# Patient Record
Sex: FEMALE | Race: WHITE | Employment: FULL TIME | URBAN - METROPOLITAN AREA
[De-identification: names, ages, dates, MRNs, and addresses within clinical notes are randomized per-mention and may not be internally consistent; named-entity substitution may affect disease eponyms.]

---

## 2023-05-08 ENCOUNTER — OFFICE VISIT (OUTPATIENT)
Dept: ORTHOPEDIC SURGERY | Age: 28
End: 2023-05-08
Payer: COMMERCIAL

## 2023-05-08 DIAGNOSIS — M87.071 ASEPTIC NECROSIS OF RIGHT TALUS (HCC): ICD-10-CM

## 2023-05-08 DIAGNOSIS — G89.29 CHRONIC PAIN OF RIGHT ANKLE: Primary | ICD-10-CM

## 2023-05-08 DIAGNOSIS — M25.571 CHRONIC PAIN OF RIGHT ANKLE: Primary | ICD-10-CM

## 2023-05-08 DIAGNOSIS — M19.079 ARTHRITIS OF SUBTALAR JOINT: ICD-10-CM

## 2023-05-08 PROCEDURE — 99204 OFFICE O/P NEW MOD 45 MIN: CPT | Performed by: ORTHOPAEDIC SURGERY

## 2023-05-08 RX ORDER — AZELAIC ACID 0.15 G/G
1 GEL TOPICAL
COMMUNITY
Start: 2023-03-24

## 2023-05-08 RX ORDER — PRENATAL VIT/IRON FUM/FOLIC AC 27MG-0.8MG
1 TABLET ORAL DAILY
COMMUNITY

## 2023-05-08 RX ORDER — LORAZEPAM 0.5 MG/1
0.5 TABLET ORAL
COMMUNITY
Start: 2023-05-04

## 2023-05-08 RX ORDER — ESCITALOPRAM OXALATE 20 MG/1
1 TABLET ORAL DAILY
COMMUNITY
Start: 2023-02-07

## 2023-05-08 NOTE — PROGRESS NOTES
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 female who presents with Ankle Pain (Right ankle pain will xray today )  . This patient presents the office of longstanding history of multiple years of right hindfoot pain and stiffness. She had 2 MR MRIs performed which were suggestive of avascular process of the talus. 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 They cannot find any reason for her to have AVN. She certainly does not have any risk factors and I can see either. She is tried bracing for this. She had an MRI done in the past and she had cast immobilization with Dr. Ton Gandara in [facility] for this. History was obtained by Patient     ROS/Meds/PSH/PMH/FH/SH: I personally reviewed the patients standard intake form. Below are the pertinents    Tobacco:  has no history on file for tobacco use. Diabetes: None      Physical Examination:  Exam of the right lower extremity shows stiffness with almost complete limitation of subtalar joint range of motion. She has tenderness to palpation at the talonavicular joint and the medial aspect of the ankle. There is no real sinus Tarsi pain. She has palpable pulses and intact sensation. Imaging:   I independently interpreted XR taken today along with the outside MRI which shows cystic changes of talonavicular joint with no evidence of coalition or global talar AVN identified  Right ankle XR: AP, Lateral, Oblique views     ICD-10-CM    1. Chronic pain of right ankle  M25.571 XR ANKLE RIGHT (MIN 3 VIEWS)    G89.29       2. Arthritis of subtalar joint  M19.079       3.  Aseptic necrosis of right talus (HCC)  M87.071          Report: AP, lateral, oblique